# Patient Record
(demographics unavailable — no encounter records)

---

## 2024-10-23 NOTE — REVIEW OF SYSTEMS
[TextEntry] : Except as noted above... Constitutional: The patient denied headache, fatigue, fever, sweats, loss of appetite or chills Eyes: The patient denied double vision, eye pain, eye discharge, red eyes or itchy eyes ENT: The patient denied ear pain, ear discharge, nasal congestion, nasal discharge, sore throat, enlarged tonsils, hoarseness, neck pain or neck swelling Cardiovascular: The patient denied dizziness, palpitations, fainting  or leg cramps Respiratory: The patient denied shortness of breath, cough, coughing up blood, wheezing, chest congestion or mucous production GI: The patient denied weight gain, weight loss, abdominal pain, nausea, vomiting, diarrhea, constipation, black stools  : The patient denied pain on urination, burning with urination, frequent urination or blood in the urine Skin: The patient denied rashes, redness or swelling Neurologic: The patient denied headache, stiff neck, weakness, numbness, difficulty speaking, unsteadiness or numbness/tingling in feet Psychiatric: The patient denied hallucinations, agitation or disorientation Endocrine: The patient denied excessive thirst, excessive urination, cold intolerance or heat intolerance Hematologic: The patient denied easy bruisability or pallor Allergic/Immunologic: The patient denied runny nose, recurrent infections, hives or pruritis Musculoskeletal: The patient denied arthritic pains, muscle weakness  Extremities: The patient denied clubbing, cyanosis or lower extremity swelling

## 2024-10-23 NOTE — DISCUSSION/SUMMARY
[FreeTextEntry1] : Musculoskeletal pain-I reassured the patient that there is no cause for concern.  She can use local anti-inflammatory treatments as needed.   Hypertension-well-controlled Hypercholesterolemia-LDL mildly elevated.  Might consider increasing rosuvastatin to 20 mg daily. 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 Return visit as per previously scheduled appointment   Total time of the encounter: 10 minutes which included but was not limited to the following: Face-to-face and non face-to-face time personally spent by the physician preparing to see the patient, obtaining and/or resuming separately obtained history, performing a medically appropriate examination and/or evaluation, counseling and educating the patient/family/caregiver, ordering medications, tests or procedures, referring and communicating with other healthcare professionals, documenting clinical information in the electronic health record, independently interpreting results and communicated results to the patient/family/caregiver and care coordination. [EKG obtained to assist in diagnosis and management of assessed problem(s)] : EKG obtained to assist in diagnosis and management of assessed problem(s)

## 2024-10-23 NOTE — PHYSICAL EXAM
[TextEntry] : General/Constitutional: WD/ WN in NAD H: NC/AT Eyes:  PERRL, sclerae and conjunctivae normal without jaundice or xanthelasma; ENMT:normal teeth, gums and palate with no petechiae, pallor or cyanosis Neck: w/o JVD, thyromegaly or adenopathy; normal venous contour Respiratory: clear to auscultation, normal respiratory effort with no retractions or use of accessory respiratory muscles Heart: [id number], regular rate, normal S1, S2 with 2/6 early peaking systolic ejection murmur left sternal border but no rubs, gallops, heaves or thrills Vascular exam: normal carotid upstrokes without carotid or abdominal bruits. 2+/2+ pulses to posterior tibialis and dorsalis pedis Abdomen: soft, nontender, bowels sounds normal without hepatomegaly or splenomegaly, masses or bruits Musculoskeletal:without significant kyphosis or scoliosis Extremities: w/o CCE, good capillary filling Skin: no stasis changes; no ulcers  Neuro: AA and O x 3; no focal neurologic deficits Psych: normal mood and affect

## 2024-10-23 NOTE — REASON FOR VISIT
[FreeTextEntry1] : The patient was in her usual state of good health until several days ago when she apparently fell in her bedroom and injured the right side of her chest on an end table.  She went to the emergency room where x-rays failed to demonstrate any fractures or pulmonary pathology.  She was somewhat concerned that it may have affected her heart.  At this point, the symptoms have largely resolved.  She does describe a little bit of musculoskeletal pain in the right scapular area but this is getting better also.

## 2024-12-10 NOTE — REVIEW OF SYSTEMS
[TextEntry] : Except as noted above... Constitutional: The patient denied headache, fatigue, fever, sweats, loss of appetite or chills Eyes: The patient denied double vision, eye pain, eye discharge, red eyes or itchy eyes ENT: The patient denied ear pain, ear discharge, nasal congestion, nasal discharge, sore throat, enlarged tonsils, hoarseness, neck pain or neck swelling Cardiovascular: The patient denied chest pain, chest discomfort, dizziness, palpitations, fainting  or leg cramps Respiratory: The patient denied shortness of breath, cough, coughing up blood, wheezing, chest congestion or mucous production GI: The patient denied weight gain, weight loss, abdominal pain, nausea, vomiting, diarrhea, constipation, black stools  : The patient denied pain on urination, burning with urination, frequent urination or blood in the urine Skin: The patient denied rashes, redness or swelling Neurologic: The patient denied headache, stiff neck, weakness, numbness, difficulty speaking, unsteadiness or numbness/tingling in feet Psychiatric: The patient denied hallucinations, agitation or disorientation Endocrine: The patient denied excessive thirst, excessive urination, cold intolerance or heat intolerance Hematologic: The patient denied easy bruisability or pallor Allergic/Immunologic: The patient denied runny nose, recurrent infections, hives or pruritis Musculoskeletal: The patient denied arthritic pains, muscle weakness  Extremities: The patient denied clubbing, cyanosis or lower extremity swelling

## 2024-12-10 NOTE — PHYSICAL EXAM
[TextEntry] : General/Constitutional: WD/ WN in NAD H: NC/AT Eyes:  PERRL, sclerae and conjunctivae normal without jaundice or xanthelasma; ENMT:normal teeth, gums and palate with no petechiae, pallor or cyanosis Neck: w/o JVD, thyromegaly or adenopathy; normal venous contour Respiratory: clear to auscultation, normal respiratory effort with no retractions or use of accessory respiratory muscles Heart: [id number], regular rate, normal S1, S2 with 2/6 early peaking systolic ejection murmur left sternal border but no rubs, gallops, heaves or thrills Vascular exam: normal carotid upstrokes without carotid or abdominal bruits. 2+/2+ pulses to posterior tibialis and dorsalis pedis Abdomen: soft, nontender, bowels sounds normal without hepatomegaly or splenomegaly, masses or bruits Musculoskeletal:without significant kyphosis or scoliosis Extremities: w/o CCE, good capillary filling Skin: no stasis changes; no ulcers  Neuro: AA and O x 3; no focal neurologic deficits Psych: normal mood and affect

## 2024-12-10 NOTE — DISCUSSION/SUMMARY
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 Status post fall-fully recovered Return visit 6 months.  Labs at the time of the next visit.   Total time of the encounter: 30 minutes which included but was not limited to the following: Face-to-face and non face-to-face time personally spent by the physician preparing to see the patient, obtaining and/or resuming separately obtained history, performing a medically appropriate examination and/or evaluation, counseling and educating the patient/family/caregiver, ordering medications, tests or procedures, referring and communicating with other healthcare professionals, documenting clinical information in the electronic health record, independently interpreting results and communicated results to the patient/family/caregiver and care coordination. [EKG obtained to assist in diagnosis and management of assessed problem(s)] : EKG obtained to assist in diagnosis and management of assessed problem(s)

## 2024-12-10 NOTE — REASON FOR VISIT
[FreeTextEntry1] : The patient is here today for follow-up of aortic valve disease, hypertension and hypercholesterolemia.  The patient is generally doing very well.  The patient appears to be fully active with no cardiopulmonary complaints or limitations.  It took a while to recover from the soft tissue trauma from the fall and she is still little bit anxious about falling again but she is trying to be more careful.

## 2025-01-21 NOTE — HISTORY OF PRESENT ILLNESS
[de-identified] : 89F last seen August 2024 by Dr. Garcia, presenting today for f/u   1. AK, right nasal bridge s/p cryo at .  2. Pruritus, back, favored notalgia paresthetica 2/2 to scoliosis 3. Rosacea, metrocream

## 2025-02-25 NOTE — PHYSICAL EXAM
[TextEntry] : General/Constitutional: WD/ WN in NAD H: NC/AT Eyes:  PERRL, sclerae and conjunctivae normal without jaundice or xanthelasma; ENMT:normal teeth, gums and palate with no petechiae, pallor or cyanosis Neck: w/o JVD, thyromegaly or adenopathy; normal venous contour; transmitted murmur in the left carotid Respiratory: clear to auscultation, normal respiratory effort with no retractions or use of accessory respiratory muscles Heart: [id number], regular rate, normal S1, S2 with 2/6 early peaking systolic ejection murmur left sternal border but no rubs, gallops, heaves or thrills Vascular exam: normal carotid upstrokes without carotid or abdominal bruits. 2+/2+ pulses to posterior tibialis and dorsalis pedis Abdomen: soft, nontender, bowels sounds normal without hepatomegaly or splenomegaly, masses or bruits Musculoskeletal:without significant kyphosis or scoliosis Extremities: w/o CCE, good capillary filling Skin: no stasis changes; no ulcers  Neuro: AA and O x 3; no focal neurologic deficits Psych: normal mood and affect

## 2025-02-25 NOTE — DISCUSSION/SUMMARY
[FreeTextEntry1] : Hypertension-well-controlled Hypercholesterolemia-continue current regimen.  Laboratory data to be drawn. 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 Memory loss-patient will see neurologist to evaluate the extent of the memory loss and to determine whether the patient can continue driving. Return visit 4 months.  Labs to be drawn   Total time of the encounter: 30 minutes which included but was not limited to the following: Face-to-face and non face-to-face time personally spent by the physician preparing to see the patient, obtaining and/or resuming separately obtained history, performing a medically appropriate examination and/or evaluation, counseling and educating the patient/family/caregiver, ordering medications, tests or procedures, referring and communicating with other healthcare professionals, documenting clinical information in the electronic health record, independently interpreting results and communicated results to the patient/family/caregiver and care coordination. [EKG obtained to assist in diagnosis and management of assessed problem(s)] : EKG obtained to assist in diagnosis and management of assessed problem(s)

## 2025-02-25 NOTE — REASON FOR VISIT
[FreeTextEntry1] : The patient is here today for follow-up of aortic valve disease, hypertension and hypercholesterolemia.  Although the patient is hemodynamically stable, she "just does not feel right".  She has been having some difficulty with occasionally getting lost when she is driving although she is able to use her GPS to find her way even when she does get lost.  She is scheduled to see neurologist in the next few weeks.  The patient lives alone and we discussed the possibility that some of this may represent mild depression.  The patient will try to start taking classes at [employer] to keep himself more active.  She is still part of several local organizations which keeps her busy also.

## 2025-07-17 NOTE — DISCUSSION/SUMMARY
[TextEntry] : Alzheimer's disease-as above Return visit 2 to 3 months for reassessment.   Total time of the encounter: 20 minutes which included but was not limited to the following: Face-to-face and non face-to-face time personally spent by the physician preparing to see the patient, obtaining and/or resuming separately obtained history, performing a medically appropriate examination and/or evaluation, counseling and educating the patient/family/caregiver, ordering medications, tests or procedures, referring and communicating with other healthcare professionals, documenting clinical information in the electronic health record, independently interpreting results and communicated results to the patient/family/caregiver and care coordination.

## 2025-07-17 NOTE — REASON FOR VISIT
[TextEntry] : The patient is here today primarily to discuss a recent diagnosis of late onset Alzheimer's disease.  The patient recently started seeing a geriatrician and was referred for neurologic evaluation because of cognitive loss.  The patient had testing done and was diagnosed with late onset Alzheimer's disease.  She was given a trial of Aricept but developed GI distress.  At this point, the patient does seem to have moderate cognitive difficulties although no overt behavioral disturbances.  I discussed the situation with her son.  He is planning to arrange for a safety check of her house and set up resources at home for monitoring.  The patient did see neurologist but she was not happy with him.  She continues to drive locally and has had no difficulties.  I briefly discussed the diagnosis with the patient and explained the concerns of her doctors and her son.  I will speak with her geriatrician about the possibility of trying Namenda (although I appreciate that it does not provide much benefit).  I also recommended that the patient begin seeing a memory specialist neurologist.  There are multiple new antiamyloid medications that are coming on the market which might help reverse the current situation if she is deemed to be an appropriate candidate.  I will try to find out who the local expert is and will convey that information to the patient.